# Patient Record
Sex: FEMALE | Race: WHITE | Employment: UNEMPLOYED | URBAN - METROPOLITAN AREA
[De-identification: names, ages, dates, MRNs, and addresses within clinical notes are randomized per-mention and may not be internally consistent; named-entity substitution may affect disease eponyms.]

---

## 2017-04-11 PROBLEM — M54.16 LUMBAR RADICULOPATHY: Status: ACTIVE | Noted: 2017-04-11

## 2017-07-26 PROBLEM — M79.641 PAIN IN BOTH HANDS: Status: ACTIVE | Noted: 2017-07-26

## 2017-07-26 PROBLEM — M79.642 PAIN IN BOTH HANDS: Status: ACTIVE | Noted: 2017-07-26

## 2017-08-08 PROCEDURE — 81003 URINALYSIS AUTO W/O SCOPE: CPT | Performed by: INTERNAL MEDICINE

## 2017-08-08 PROCEDURE — 87081 CULTURE SCREEN ONLY: CPT | Performed by: INTERNAL MEDICINE

## 2017-08-24 ENCOUNTER — APPOINTMENT (OUTPATIENT)
Dept: GENERAL RADIOLOGY | Facility: HOSPITAL | Age: 52
DRG: 029 | End: 2017-08-24
Attending: ORTHOPAEDIC SURGERY
Payer: OTHER GOVERNMENT

## 2017-08-24 ENCOUNTER — SURGERY (OUTPATIENT)
Age: 52
End: 2017-08-24

## 2017-08-24 ENCOUNTER — ANESTHESIA EVENT (OUTPATIENT)
Dept: SURGERY | Facility: HOSPITAL | Age: 52
DRG: 029 | End: 2017-08-24
Payer: OTHER GOVERNMENT

## 2017-08-24 ENCOUNTER — ANESTHESIA (OUTPATIENT)
Dept: SURGERY | Facility: HOSPITAL | Age: 52
DRG: 029 | End: 2017-08-24
Payer: OTHER GOVERNMENT

## 2017-08-24 ENCOUNTER — HOSPITAL ENCOUNTER (INPATIENT)
Facility: HOSPITAL | Age: 52
LOS: 2 days | Discharge: HOME | DRG: 029 | End: 2017-08-26
Attending: ORTHOPAEDIC SURGERY | Admitting: ORTHOPAEDIC SURGERY
Payer: OTHER GOVERNMENT

## 2017-08-24 DIAGNOSIS — M79.641 PAIN IN BOTH HANDS: ICD-10-CM

## 2017-08-24 DIAGNOSIS — M79.642 PAIN IN BOTH HANDS: ICD-10-CM

## 2017-08-24 DIAGNOSIS — M47.812 CERVICAL SPONDYLOSIS WITHOUT MYELOPATHY: ICD-10-CM

## 2017-08-24 DIAGNOSIS — M50.123 CERVICAL DISC DISORDER AT C6-C7 LEVEL WITH RADICULOPATHY: ICD-10-CM

## 2017-08-24 DIAGNOSIS — M54.12 CERVICAL RADICULITIS: Primary | ICD-10-CM

## 2017-08-24 LAB — B-HCG UR QL: NEGATIVE

## 2017-08-24 PROCEDURE — 95939 C MOTOR EVOKED UPR&LWR LIMBS: CPT | Performed by: ORTHOPAEDIC SURGERY

## 2017-08-24 PROCEDURE — 95938 SOMATOSENSORY TESTING: CPT | Performed by: ORTHOPAEDIC SURGERY

## 2017-08-24 PROCEDURE — 76001 XR C-ARM FLUORO >1 HOUR  (CPT=76001): CPT | Performed by: ORTHOPAEDIC SURGERY

## 2017-08-24 PROCEDURE — 0RB30ZZ EXCISION OF CERVICAL VERTEBRAL DISC, OPEN APPROACH: ICD-10-PCS | Performed by: ORTHOPAEDIC SURGERY

## 2017-08-24 PROCEDURE — 01N10ZZ RELEASE CERVICAL NERVE, OPEN APPROACH: ICD-10-PCS | Performed by: ORTHOPAEDIC SURGERY

## 2017-08-24 PROCEDURE — 81025 URINE PREGNANCY TEST: CPT

## 2017-08-24 PROCEDURE — 0RG20A0 FUSION OF 2 OR MORE CERVICAL VERTEBRAL JOINTS WITH INTERBODY FUSION DEVICE, ANTERIOR APPROACH, ANTERIOR COLUMN, OPEN APPROACH: ICD-10-PCS | Performed by: ORTHOPAEDIC SURGERY

## 2017-08-24 PROCEDURE — 72020 X-RAY EXAM OF SPINE 1 VIEW: CPT | Performed by: ORTHOPAEDIC SURGERY

## 2017-08-24 PROCEDURE — 95860 NEEDLE EMG 1 EXTREMITY: CPT | Performed by: ORTHOPAEDIC SURGERY

## 2017-08-24 DEVICE — IMPLANTABLE DEVICE: Type: IMPLANTABLE DEVICE | Site: NECK | Status: FUNCTIONAL

## 2017-08-24 DEVICE — GRAFT BN DMNR FBR 5ML: Type: IMPLANTABLE DEVICE | Site: NECK | Status: FUNCTIONAL

## 2017-08-24 RX ORDER — SODIUM CHLORIDE 9 MG/ML
INJECTION, SOLUTION INTRAVENOUS CONTINUOUS PRN
Status: DISCONTINUED | OUTPATIENT
Start: 2017-08-24 | End: 2017-08-24 | Stop reason: SURG

## 2017-08-24 RX ORDER — SODIUM CHLORIDE, SODIUM LACTATE, POTASSIUM CHLORIDE, CALCIUM CHLORIDE 600; 310; 30; 20 MG/100ML; MG/100ML; MG/100ML; MG/100ML
INJECTION, SOLUTION INTRAVENOUS CONTINUOUS
Status: DISCONTINUED | OUTPATIENT
Start: 2017-08-24 | End: 2017-08-25

## 2017-08-24 RX ORDER — METOCLOPRAMIDE HYDROCHLORIDE 5 MG/ML
10 INJECTION INTRAMUSCULAR; INTRAVENOUS EVERY 6 HOURS PRN
Status: DISCONTINUED | OUTPATIENT
Start: 2017-08-24 | End: 2017-08-26

## 2017-08-24 RX ORDER — LIDOCAINE HYDROCHLORIDE 10 MG/ML
INJECTION, SOLUTION EPIDURAL; INFILTRATION; INTRACAUDAL; PERINEURAL AS NEEDED
Status: DISCONTINUED | OUTPATIENT
Start: 2017-08-24 | End: 2017-08-24 | Stop reason: SURG

## 2017-08-24 RX ORDER — ACETAMINOPHEN 325 MG/1
650 TABLET ORAL ONCE
Status: COMPLETED | OUTPATIENT
Start: 2017-08-24 | End: 2017-08-24

## 2017-08-24 RX ORDER — MORPHINE SULFATE 2 MG/ML
2 INJECTION, SOLUTION INTRAMUSCULAR; INTRAVENOUS EVERY 10 MIN PRN
Status: DISCONTINUED | OUTPATIENT
Start: 2017-08-24 | End: 2017-08-24 | Stop reason: HOSPADM

## 2017-08-24 RX ORDER — GLYCOPYRROLATE 0.2 MG/ML
INJECTION INTRAMUSCULAR; INTRAVENOUS AS NEEDED
Status: DISCONTINUED | OUTPATIENT
Start: 2017-08-24 | End: 2017-08-24 | Stop reason: SURG

## 2017-08-24 RX ORDER — METOCLOPRAMIDE 10 MG/1
10 TABLET ORAL ONCE
Status: DISCONTINUED | OUTPATIENT
Start: 2017-08-24 | End: 2017-08-24 | Stop reason: HOSPADM

## 2017-08-24 RX ORDER — HYDROCODONE BITARTRATE AND ACETAMINOPHEN 10; 325 MG/1; MG/1
2 TABLET ORAL EVERY 4 HOURS PRN
Status: DISCONTINUED | OUTPATIENT
Start: 2017-08-24 | End: 2017-08-26

## 2017-08-24 RX ORDER — HYDROCODONE BITARTRATE AND ACETAMINOPHEN 10; 325 MG/1; MG/1
1 TABLET ORAL EVERY 6 HOURS PRN
Qty: 80 TABLET | Refills: 0 | Status: SHIPPED | OUTPATIENT
Start: 2017-08-24 | End: 2017-08-25

## 2017-08-24 RX ORDER — SODIUM PHOSPHATE, DIBASIC AND SODIUM PHOSPHATE, MONOBASIC 7; 19 G/133ML; G/133ML
1 ENEMA RECTAL ONCE AS NEEDED
Status: DISCONTINUED | OUTPATIENT
Start: 2017-08-24 | End: 2017-08-26

## 2017-08-24 RX ORDER — ONDANSETRON 2 MG/ML
4 INJECTION INTRAMUSCULAR; INTRAVENOUS EVERY 4 HOURS PRN
Status: DISPENSED | OUTPATIENT
Start: 2017-08-24 | End: 2017-08-25

## 2017-08-24 RX ORDER — HYDROCODONE BITARTRATE AND ACETAMINOPHEN 5; 325 MG/1; MG/1
2 TABLET ORAL AS NEEDED
Status: DISCONTINUED | OUTPATIENT
Start: 2017-08-24 | End: 2017-08-24 | Stop reason: HOSPADM

## 2017-08-24 RX ORDER — CYCLOBENZAPRINE HCL 10 MG
10 TABLET ORAL EVERY 8 HOURS PRN
Status: DISCONTINUED | OUTPATIENT
Start: 2017-08-24 | End: 2017-08-26

## 2017-08-24 RX ORDER — MORPHINE SULFATE 4 MG/ML
6 INJECTION, SOLUTION INTRAMUSCULAR; INTRAVENOUS EVERY 10 MIN PRN
Status: DISCONTINUED | OUTPATIENT
Start: 2017-08-24 | End: 2017-08-24 | Stop reason: HOSPADM

## 2017-08-24 RX ORDER — ACETAMINOPHEN 325 MG/1
650 TABLET ORAL EVERY 4 HOURS PRN
Status: DISCONTINUED | OUTPATIENT
Start: 2017-08-24 | End: 2017-08-26

## 2017-08-24 RX ORDER — MAGNESIUM OXIDE 400 MG (241.3 MG MAGNESIUM) TABLET
3 TABLET NIGHTLY
Status: DISCONTINUED | OUTPATIENT
Start: 2017-08-24 | End: 2017-08-26

## 2017-08-24 RX ORDER — MIDAZOLAM HYDROCHLORIDE 1 MG/ML
INJECTION INTRAMUSCULAR; INTRAVENOUS AS NEEDED
Status: DISCONTINUED | OUTPATIENT
Start: 2017-08-24 | End: 2017-08-24 | Stop reason: SURG

## 2017-08-24 RX ORDER — DIPHENHYDRAMINE HYDROCHLORIDE 50 MG/ML
25 INJECTION INTRAMUSCULAR; INTRAVENOUS EVERY 4 HOURS PRN
Status: DISCONTINUED | OUTPATIENT
Start: 2017-08-24 | End: 2017-08-26

## 2017-08-24 RX ORDER — ONDANSETRON 2 MG/ML
4 INJECTION INTRAMUSCULAR; INTRAVENOUS ONCE AS NEEDED
Status: DISCONTINUED | OUTPATIENT
Start: 2017-08-24 | End: 2017-08-24 | Stop reason: HOSPADM

## 2017-08-24 RX ORDER — MORPHINE SULFATE 4 MG/ML
4 INJECTION, SOLUTION INTRAMUSCULAR; INTRAVENOUS EVERY 10 MIN PRN
Status: DISCONTINUED | OUTPATIENT
Start: 2017-08-24 | End: 2017-08-24 | Stop reason: HOSPADM

## 2017-08-24 RX ORDER — DEXAMETHASONE SODIUM PHOSPHATE 4 MG/ML
VIAL (ML) INJECTION AS NEEDED
Status: DISCONTINUED | OUTPATIENT
Start: 2017-08-24 | End: 2017-08-24 | Stop reason: SURG

## 2017-08-24 RX ORDER — MELATONIN
3 NIGHTLY
COMMUNITY
End: 2018-01-15

## 2017-08-24 RX ORDER — NALOXONE HYDROCHLORIDE 0.4 MG/ML
80 INJECTION, SOLUTION INTRAMUSCULAR; INTRAVENOUS; SUBCUTANEOUS AS NEEDED
Status: DISCONTINUED | OUTPATIENT
Start: 2017-08-24 | End: 2017-08-24 | Stop reason: HOSPADM

## 2017-08-24 RX ORDER — FAMOTIDINE 20 MG/1
20 TABLET ORAL ONCE
Status: DISCONTINUED | OUTPATIENT
Start: 2017-08-24 | End: 2017-08-24 | Stop reason: HOSPADM

## 2017-08-24 RX ORDER — DIAZEPAM 5 MG/1
5 TABLET ORAL EVERY 6 HOURS PRN
Status: DISCONTINUED | OUTPATIENT
Start: 2017-08-24 | End: 2017-08-26

## 2017-08-24 RX ORDER — HYDROMORPHONE HYDROCHLORIDE 1 MG/ML
0.4 INJECTION, SOLUTION INTRAMUSCULAR; INTRAVENOUS; SUBCUTANEOUS EVERY 5 MIN PRN
Status: DISCONTINUED | OUTPATIENT
Start: 2017-08-24 | End: 2017-08-24 | Stop reason: HOSPADM

## 2017-08-24 RX ORDER — POLYETHYLENE GLYCOL 3350 17 G/17G
17 POWDER, FOR SOLUTION ORAL DAILY PRN
Status: DISCONTINUED | OUTPATIENT
Start: 2017-08-24 | End: 2017-08-26

## 2017-08-24 RX ORDER — CYCLOBENZAPRINE HCL 10 MG
10 TABLET ORAL 3 TIMES DAILY
Qty: 80 TABLET | Refills: 1 | Status: SHIPPED | OUTPATIENT
Start: 2017-08-24 | End: 2017-08-26

## 2017-08-24 RX ORDER — SENNOSIDES 8.6 MG
17.2 TABLET ORAL NIGHTLY
Status: DISCONTINUED | OUTPATIENT
Start: 2017-08-24 | End: 2017-08-26

## 2017-08-24 RX ORDER — GABAPENTIN 300 MG/1
300 CAPSULE ORAL 3 TIMES DAILY
Status: DISCONTINUED | OUTPATIENT
Start: 2017-08-24 | End: 2017-08-26

## 2017-08-24 RX ORDER — DIPHENHYDRAMINE HCL 25 MG
25 CAPSULE ORAL EVERY 4 HOURS PRN
Status: DISCONTINUED | OUTPATIENT
Start: 2017-08-24 | End: 2017-08-26

## 2017-08-24 RX ORDER — HYDROMORPHONE HYDROCHLORIDE 1 MG/ML
0.6 INJECTION, SOLUTION INTRAMUSCULAR; INTRAVENOUS; SUBCUTANEOUS EVERY 5 MIN PRN
Status: DISCONTINUED | OUTPATIENT
Start: 2017-08-24 | End: 2017-08-24 | Stop reason: HOSPADM

## 2017-08-24 RX ORDER — ONDANSETRON 2 MG/ML
INJECTION INTRAMUSCULAR; INTRAVENOUS AS NEEDED
Status: DISCONTINUED | OUTPATIENT
Start: 2017-08-24 | End: 2017-08-24 | Stop reason: SURG

## 2017-08-24 RX ORDER — HYDROCODONE BITARTRATE AND ACETAMINOPHEN 5; 325 MG/1; MG/1
1 TABLET ORAL AS NEEDED
Status: DISCONTINUED | OUTPATIENT
Start: 2017-08-24 | End: 2017-08-24 | Stop reason: HOSPADM

## 2017-08-24 RX ORDER — DOCUSATE SODIUM 100 MG/1
100 CAPSULE, LIQUID FILLED ORAL 2 TIMES DAILY
Status: DISCONTINUED | OUTPATIENT
Start: 2017-08-24 | End: 2017-08-26

## 2017-08-24 RX ORDER — BISACODYL 10 MG
10 SUPPOSITORY, RECTAL RECTAL
Status: DISCONTINUED | OUTPATIENT
Start: 2017-08-24 | End: 2017-08-26

## 2017-08-24 RX ORDER — SUCCINYLCHOLINE CHLORIDE 20 MG/ML
INJECTION INTRAMUSCULAR; INTRAVENOUS AS NEEDED
Status: DISCONTINUED | OUTPATIENT
Start: 2017-08-24 | End: 2017-08-24 | Stop reason: SURG

## 2017-08-24 RX ORDER — HYDROMORPHONE HYDROCHLORIDE 1 MG/ML
0.2 INJECTION, SOLUTION INTRAMUSCULAR; INTRAVENOUS; SUBCUTANEOUS EVERY 5 MIN PRN
Status: DISCONTINUED | OUTPATIENT
Start: 2017-08-24 | End: 2017-08-24 | Stop reason: HOSPADM

## 2017-08-24 RX ORDER — HYDROCODONE BITARTRATE AND ACETAMINOPHEN 10; 325 MG/1; MG/1
1 TABLET ORAL EVERY 4 HOURS PRN
Status: DISCONTINUED | OUTPATIENT
Start: 2017-08-24 | End: 2017-08-26

## 2017-08-24 RX ADMIN — SUCCINYLCHOLINE CHLORIDE 160 MG: 20 INJECTION INTRAMUSCULAR; INTRAVENOUS at 07:47:00

## 2017-08-24 RX ADMIN — DEXAMETHASONE SODIUM PHOSPHATE 4 MG: 4 MG/ML VIAL (ML) INJECTION at 07:45:00

## 2017-08-24 RX ADMIN — ONDANSETRON 4 MG: 2 INJECTION INTRAMUSCULAR; INTRAVENOUS at 07:45:00

## 2017-08-24 RX ADMIN — LIDOCAINE HYDROCHLORIDE 20 MG: 10 INJECTION, SOLUTION EPIDURAL; INFILTRATION; INTRACAUDAL; PERINEURAL at 07:47:00

## 2017-08-24 RX ADMIN — SODIUM CHLORIDE: 9 INJECTION, SOLUTION INTRAVENOUS at 08:00:00

## 2017-08-24 RX ADMIN — MIDAZOLAM HYDROCHLORIDE 2 MG: 1 INJECTION INTRAMUSCULAR; INTRAVENOUS at 07:45:00

## 2017-08-24 RX ADMIN — LIDOCAINE HYDROCHLORIDE 30 MG: 10 INJECTION, SOLUTION EPIDURAL; INFILTRATION; INTRACAUDAL; PERINEURAL at 07:45:00

## 2017-08-24 RX ADMIN — SODIUM CHLORIDE, SODIUM LACTATE, POTASSIUM CHLORIDE, CALCIUM CHLORIDE: 600; 310; 30; 20 INJECTION, SOLUTION INTRAVENOUS at 07:38:00

## 2017-08-24 RX ADMIN — SODIUM CHLORIDE, SODIUM LACTATE, POTASSIUM CHLORIDE, CALCIUM CHLORIDE: 600; 310; 30; 20 INJECTION, SOLUTION INTRAVENOUS at 09:55:00

## 2017-08-24 RX ADMIN — GLYCOPYRROLATE 0.2 MG: 0.2 INJECTION INTRAMUSCULAR; INTRAVENOUS at 07:45:00

## 2017-08-24 NOTE — ANESTHESIA PREPROCEDURE EVALUATION
Anesthesia PreOp Note    HPI:     Louis Jimenez is a 46year old female who presents for preoperative consultation requested by: Avril Gutierrez MD    Date of Surgery: 8/24/2017    Procedure(s):  ANTERIOR CERVICAL FUSION BG & INST 1 LEVEL  Indication: F Disorder Father    • Hypertension Father    • Alcohol and Other Disorders Associated Father    • Depression Sister    • Cancer Mother      Breast cancer   • Diabetes Maternal Grandmother    • Alcohol and Other Disorders Associated Maternal Grandfather    • history of anesthetic complications   Airway   Mallampati: II  Neck ROM: full  Dental - normal exam     Pulmonary - negative ROS and normal exam   Cardiovascular - negative ROS and normal exam    Neuro/Psych - negative ROS     GI/Hepatic/Renal - negative R

## 2017-08-24 NOTE — H&P
Spine Surgery H&P  Dr. Steven Andrews Patient Status:  Surgery Admit    1965 MRN [id number]   Location [facility] Attending Barb Perez MD   Hosp Day # 0 PCP Kiana Bingham MD     Subjective:  Paul Jordan Years of education:                 Number of children:               Social History Main Topics    Smoking status: Former Smoker                                                                Packs/day: 1.00      Years: 13.00       Smoke no step off   Spine is balance in the sagittal and coronal plane  Gait : Tandem    Upper Extremities Motor Exam:    Deep Tendon reflexes :     FDP WE WF EE EF Delt     BTR TTR   Right 5 5 5 5 5 5  Right :  2+ 2+  Left 5 5 5 5 5  5 /5  Left   :  2+ 2+    Sa eccentric to the right. Right facet  arthropathy is noted. Tiny ectatic nerve root sleeves are present bilaterally.  Findings result in  moderate to severe right foraminal narrowing and moderate left foraminal narrowing, similar to prior    Labs:       Lab

## 2017-08-24 NOTE — H&P
[facility] Hospitalist Team  History and Physical     ASSESSMENT / PLAN:   47 yo female with hx insomnia and cervical radiculopathy who presents for surgery after failing outpt medical management.  Pt is S/P ACDF C5-C7, see below for details    S/P ACDF C5-C7  -IV peripheral pulses intact       PMH  Past Medical History:   Diagnosis Date   • Cancer ([facility] [address]     ORAL   • Cervical radiculopathy    • Other and unspecified hyperlipidemia    • Plantar fasciitis         PSH  Past Surgical History:  No date:       Co PLT, BAND, INR in the last 72 hours. Invalid input(s): LYM#, MONO#, BASOS#, EOSIN#    No results for input(s): NA, K, CL, CO2, BUN, CREATSERUM, GLU, CA, CAION, MG, PHOS in the last 72 hours.     No results for input(s): ALT, AST, ALB, AMYLASE, LIPASE, LD meds  -monitor for acute blood loss anemia, preop hemoglobin per outpatient chart review 12.2  -Bowel reg, antiemetics   -DVT Prophy- SCD  -PT/OT/SW  -as per surgery     Rest as above with above

## 2017-08-24 NOTE — OPERATIVE REPORT
Hospital  Operative Report    [facility] Patient Status:  Surgery Admit    1965 MRN [id number]   Location [address] [facility] Attending Jasbir Pedraza MD   Hosp Day # 0 PCP Amalia Tidwell MD     2017  10:00 AM    PREOPERAT Then we turned our attention to the neck part of the surgery.   We obtained lateral fluoroscopy and made a 2-cm incision lateral and just anterior to the SCM at the level of C5-6, dissected through subcutaneous tissue, exposed the platysma with Michelle Gondola the discectomy with a straight curette. She had bilateral hypertrophic uncinate processes which were resected with a bur. Subsequently, the PLL could be elevated with the Microhook hook and resected, first with the Kerrison 1 then 2.   There was abundant

## 2017-08-24 NOTE — ANESTHESIA POSTPROCEDURE EVALUATION
Patient: Ratna Jimenes    Procedure Summary     Date:  08/24/17 Room / Location:  [address] [place] [address] MAIN OR 11 / [address] [place] [address] MAIN OR    Anesthesia Start:  1141 Anesthesia Stop:      Procedure:  ANTERIOR CERVICAL FUSION BG & INST 1 LEVEL (N/A Neck) Diagnosis:  (Foraminal stenosi

## 2017-08-25 LAB
ANION GAP SERPL CALC-SCNC: 5 MMOL/L (ref 0–18)
BASOPHILS # BLD: 0 K/UL (ref 0–0.2)
BASOPHILS NFR BLD: 0 %
BUN SERPL-MCNC: 4 MG/DL (ref 8–20)
BUN/CREAT SERPL: 6.6 (ref 10–20)
CALCIUM SERPL-MCNC: 8.7 MG/DL (ref 8.5–10.5)
CHLORIDE SERPL-SCNC: 107 MMOL/L (ref 95–110)
CO2 SERPL-SCNC: 31 MMOL/L (ref 22–32)
CREAT SERPL-MCNC: 0.61 MG/DL (ref 0.5–1.5)
EOSINOPHIL # BLD: 0.2 K/UL (ref 0–0.7)
EOSINOPHIL NFR BLD: 3 %
ERYTHROCYTE [DISTWIDTH] IN BLOOD BY AUTOMATED COUNT: 12.7 % (ref 11–15)
GLUCOSE SERPL-MCNC: 110 MG/DL (ref 70–99)
HCT VFR BLD AUTO: 36 % (ref 35–48)
HGB BLD-MCNC: 12.1 G/DL (ref 12–16)
LYMPHOCYTES # BLD: 1.6 K/UL (ref 1–4)
LYMPHOCYTES NFR BLD: 23 %
MCH RBC QN AUTO: 30.2 PG (ref 27–32)
MCHC RBC AUTO-ENTMCNC: 33.5 G/DL (ref 32–37)
MCV RBC AUTO: 90 FL (ref 80–100)
MONOCYTES # BLD: 0.6 K/UL (ref 0–1)
MONOCYTES NFR BLD: 9 %
NEUTROPHILS # BLD AUTO: 4.4 K/UL (ref 1.8–7.7)
NEUTROPHILS NFR BLD: 64 %
OSMOLALITY UR CALC.SUM OF ELEC: 294 MOSM/KG (ref 275–295)
PLATELET # BLD AUTO: 168 K/UL (ref 140–400)
PMV BLD AUTO: 9.4 FL (ref 7.4–10.3)
POTASSIUM SERPL-SCNC: 3.5 MMOL/L (ref 3.3–5.1)
RBC # BLD AUTO: 4 M/UL (ref 3.7–5.4)
SODIUM SERPL-SCNC: 143 MMOL/L (ref 136–144)
WBC # BLD AUTO: 6.9 K/UL (ref 4–11)

## 2017-08-25 PROCEDURE — 97535 SELF CARE MNGMENT TRAINING: CPT

## 2017-08-25 PROCEDURE — 97161 PT EVAL LOW COMPLEX 20 MIN: CPT

## 2017-08-25 PROCEDURE — 80048 BASIC METABOLIC PNL TOTAL CA: CPT | Performed by: NURSE PRACTITIONER

## 2017-08-25 PROCEDURE — 97165 OT EVAL LOW COMPLEX 30 MIN: CPT

## 2017-08-25 PROCEDURE — 85025 COMPLETE CBC W/AUTO DIFF WBC: CPT | Performed by: NURSE PRACTITIONER

## 2017-08-25 PROCEDURE — 97530 THERAPEUTIC ACTIVITIES: CPT

## 2017-08-25 RX ORDER — 0.9 % SODIUM CHLORIDE 0.9 %
VIAL (ML) INJECTION
Status: DISPENSED
Start: 2017-08-25 | End: 2017-08-26

## 2017-08-25 RX ORDER — BUTALBITAL, ACETAMINOPHEN AND CAFFEINE 50; 325; 40 MG/1; MG/1; MG/1
1 TABLET ORAL EVERY 4 HOURS PRN
Status: DISCONTINUED | OUTPATIENT
Start: 2017-08-25 | End: 2017-08-26

## 2017-08-25 RX ORDER — HYDROCODONE BITARTRATE AND ACETAMINOPHEN 10; 325 MG/1; MG/1
TABLET ORAL
Qty: 60 TABLET | Refills: 0 | Status: SHIPPED | OUTPATIENT
Start: 2017-08-25 | End: 2017-11-20

## 2017-08-25 NOTE — OCCUPATIONAL THERAPY NOTE
OCCUPATIONAL THERAPY QUICK EVALUATION - INPATIENT    Room Number: 406/406-A  Evaluation Date: 8/25/2017     Type of Evaluation: Initial  Presenting Problem: ACDF C5-7    Physician Order: IP Consult to Occupational Therapy  Reason for Therapy:  ADL/IADL Dys Upper extremity strength is within functional limits     ACTIVITIES OF DAILY LIVING ASSESSMENT  AM-PAC ‘6-Clicks’ Inpatient Daily Activity Short Form  How much help from another person does the patient currently need…  -   Putting on and taking off reg Pt demonstrating understanding of information as discussed. Return to home seems reasonable. At end of session pt returned to bed and left w/ all needs in reach. RN aware.  No further OT contact planned    OT Discharge Recommendations: Home  OT Device Recom

## 2017-08-25 NOTE — PROGRESS NOTES
[facility]  Progress Note    Beth Paula Patient Status:  Inpatient    1965 MRN [id number]   Location [facility] 4W/SW/SE Attending Sharath Greer MD   Hosp Day # 1 PCP Corinne Solano MD     Subjective:  Beth Paula is a(n) 46 ye lateral flexion of cervical spine. No JVD. Supple. Lungs: Clear to auscultation bilaterally. Cardiac: Regular rate and rhythm. No murmur. Abdomen:  Soft, non-distended, non-tender, with no rebound or guarding. No peritoneal signs. No ascites.   Liver

## 2017-08-25 NOTE — PHYSICAL THERAPY NOTE
PHYSICAL THERAPY QUICK EVALUATION - INPATIENT    Room Number: 406/406-A  Evaluation Date: 8/25/2017  Presenting Problem: C5-7 anterior cervica discectmy & fusion  Physician Order: PT Eval and Treat    Problem List  Principal Problem:    Cervical radiculiti walk in hospital room?: A Little   -   Climbing 3-5 steps with a railing?: A Little       AM-PAC Score:  Raw Score: 22   PT Approx Degree of Impairment Score: 20.91%   Standardized Score (AM-PAC Scale): 53.28   CMS Modifier (G-Code): CJ      FUNCTIONAL AUGUSTIN

## 2017-08-25 NOTE — DISCHARGE PLANNING
MD order received regarding HHC. The pt. Is refusing HHC as she has family that will be assisting her at home.       [facility] [place] [phone]

## 2017-08-25 NOTE — DISCHARGE SUMMARY
[facility] Hospitalist Discharge Summary   Patient ID:  Gamaliel Lucas  [id number]  46year old  2/11/1965    Admit date: 8/24/2017  Discharge date: 8/25/2017  Risk of Readmission Lace+ Score: 1  59-90 High Risk  29-58 Medium Risk  0-28   Low Risk    Primary Care localization C6 level during anterior cervical discectomy and cervical spinal fusion procedure from C5-C6-C7.  Normal alignment           Discharge Instructions     Medication List      START taking these medications    Cyclobenzaprine HCl 10 MG Tabs  Commo good  Total Time Coordinating Care: greater  than 30 minutes    Patient had opportunity to ask questions and state understand and agree with therapeutic plan as outlined    Kaleb  RN, NP   [facility] Hospitalist Team  Pager [phone]  Answering Service n

## 2017-08-25 NOTE — CM/SW NOTE
CTL update from Dr. Kiana Aviles and RN. Met with patient at bedside stating she anticipates going home this evening. Her  has Parkinsons's but is able to drive and will transport her home to [place].   Patient's mother will fly in from [place] to [facility]

## 2017-08-26 VITALS
TEMPERATURE: 99 F | WEIGHT: 132 LBS | HEIGHT: 63 IN | HEART RATE: 92 BPM | SYSTOLIC BLOOD PRESSURE: 120 MMHG | BODY MASS INDEX: 23.39 KG/M2 | OXYGEN SATURATION: 94 % | RESPIRATION RATE: 16 BRPM | DIASTOLIC BLOOD PRESSURE: 60 MMHG

## 2017-08-26 LAB
BASOPHILS # BLD: 0 K/UL (ref 0–0.2)
BASOPHILS NFR BLD: 0 %
EOSINOPHIL # BLD: 0.2 K/UL (ref 0–0.7)
EOSINOPHIL NFR BLD: 3 %
ERYTHROCYTE [DISTWIDTH] IN BLOOD BY AUTOMATED COUNT: 12.9 % (ref 11–15)
HCT VFR BLD AUTO: 35.9 % (ref 35–48)
HGB BLD-MCNC: 12.2 G/DL (ref 12–16)
LYMPHOCYTES # BLD: 2.1 K/UL (ref 1–4)
LYMPHOCYTES NFR BLD: 30 %
MCH RBC QN AUTO: 30.4 PG (ref 27–32)
MCHC RBC AUTO-ENTMCNC: 33.9 G/DL (ref 32–37)
MCV RBC AUTO: 89.6 FL (ref 80–100)
MONOCYTES # BLD: 0.7 K/UL (ref 0–1)
MONOCYTES NFR BLD: 10 %
NEUTROPHILS # BLD AUTO: 4 K/UL (ref 1.8–7.7)
NEUTROPHILS NFR BLD: 57 %
PLATELET # BLD AUTO: 188 K/UL (ref 140–400)
PMV BLD AUTO: 9.5 FL (ref 7.4–10.3)
RBC # BLD AUTO: 4 M/UL (ref 3.7–5.4)
WBC # BLD AUTO: 6.9 K/UL (ref 4–11)

## 2017-08-26 PROCEDURE — 85025 COMPLETE CBC W/AUTO DIFF WBC: CPT | Performed by: NURSE PRACTITIONER

## 2017-08-26 RX ORDER — DIAZEPAM 5 MG/1
5 TABLET ORAL EVERY 6 HOURS PRN
Qty: 30 TABLET | Refills: 0 | Status: SHIPPED | OUTPATIENT
Start: 2017-08-26 | End: 2017-10-09

## 2017-08-26 RX ORDER — DIAZEPAM 10 MG/1
10 TABLET ORAL EVERY 8 HOURS PRN
Qty: 30 TABLET | Refills: 0 | Status: SHIPPED | OUTPATIENT
Start: 2017-08-26 | End: 2017-08-26

## 2017-08-26 RX ORDER — DIAZEPAM 5 MG/1
5 TABLET ORAL EVERY 6 HOURS PRN
Qty: 30 TABLET | Refills: 0 | Status: SHIPPED | OUTPATIENT
Start: 2017-08-26 | End: 2017-08-26

## 2017-08-26 RX ORDER — DIAZEPAM 10 MG/1
5 TABLET ORAL EVERY 6 HOURS PRN
Qty: 30 TABLET | Refills: 0 | Status: SHIPPED | OUTPATIENT
Start: 2017-08-26 | End: 2017-08-26

## 2017-08-26 NOTE — PROGRESS NOTES
[facility] Hospitalist Progress Note     CC: Hospital Follow up    PCP: Vickey Kaplan MD    Patient seen and examined at approx 1230pm   Assessment/Plan:   47 yo female with hx insomnia and cervical radiculopathy who presents for surgery after failing outpt medic intact, sensory intact  Psych: Affect- normal, AAOx3  SKIN: warm, dry  EXT: no lower extremity edema, no clubbing or cyanosis    Data Review:       Labs:     Recent Labs   Lab  08/25/17   0920   RBC  4.00   HGB  12.1   HCT  36.0   MCV  90.0   MCH  30.2   M

## 2017-08-27 NOTE — DISCHARGE SUMMARY
[facility] Hospitalist Discharge Summary   Patient ID:  Marilyn Mccormick  [id number]  46year old  2/11/1965    Admit date: 8/24/2017  Discharge date: 8/26/2017  Primary Care Physician: Elicia Bamberger, MD   Attending Physician: No att. providers found   Consults:   Co NEURONTIN  Take 1 capsule (300 mg total) by mouth 3 (three) times daily.      melatonin 3 MG Tabs        STOP taking these medications    ESTROVEN PM OR     ibuprofen 200 MG Tabs  Commonly known as:  MOTRIN           Where to Get Your Medications      You c

## 2017-12-05 ENCOUNTER — OFFICE VISIT (OUTPATIENT)
Dept: PHYSICAL THERAPY | Age: 52
End: 2017-12-05
Attending: ORTHOPAEDIC SURGERY
Payer: OTHER GOVERNMENT

## 2017-12-05 DIAGNOSIS — M50.123 CERVICAL DISC DISORDER AT C6-C7 LEVEL WITH RADICULOPATHY: ICD-10-CM

## 2017-12-05 DIAGNOSIS — Q76.1 CERVICAL FUSION SYNDROME: ICD-10-CM

## 2017-12-05 PROCEDURE — 97162 PT EVAL MOD COMPLEX 30 MIN: CPT

## 2017-12-05 PROCEDURE — 97110 THERAPEUTIC EXERCISES: CPT

## 2017-12-05 NOTE — PROGRESS NOTES
SPINE EVALUATION:   Referring Physician: Dr. Elly Arana  Diagnosis:  Cervical disc disorder at C6-C7 level with radiculopathy (N06.608)  Cervical fusion syndrome (Q76.1)     Date of Service: 12/5/2017     PATIENT SUMMARY   [facility] is a 46year old upper trapezius L > R. The patient presents with minimal UE weakness with pain during left shoulder flexion. The patient is unable to hold onto items due to her numbness and tingling.  Her current functional limitations include lifting, looking over her arie pain free shoulder strength to 4+/5 in order to facilitate proper lifting mechanics for weight >15lbs to support activities at her workplace ([employer]).   Patient will be independent and compliant with HEP to support improvements made in physical therapy (8

## 2017-12-07 ENCOUNTER — OFFICE VISIT (OUTPATIENT)
Dept: PHYSICAL THERAPY | Age: 52
End: 2017-12-07
Attending: ORTHOPAEDIC SURGERY
Payer: OTHER GOVERNMENT

## 2017-12-07 DIAGNOSIS — M50.123 CERVICAL DISC DISORDER AT C6-C7 LEVEL WITH RADICULOPATHY: ICD-10-CM

## 2017-12-07 DIAGNOSIS — Q76.1 CERVICAL FUSION SYNDROME: ICD-10-CM

## 2017-12-07 PROCEDURE — 97140 MANUAL THERAPY 1/> REGIONS: CPT

## 2017-12-07 PROCEDURE — 97112 NEUROMUSCULAR REEDUCATION: CPT

## 2017-12-07 PROCEDURE — 97110 THERAPEUTIC EXERCISES: CPT

## 2017-12-11 ENCOUNTER — TELEPHONE (OUTPATIENT)
Dept: PHYSICAL THERAPY | Age: 52
End: 2017-12-11

## 2017-12-13 ENCOUNTER — OFFICE VISIT (OUTPATIENT)
Dept: PHYSICAL THERAPY | Age: 52
End: 2017-12-13
Attending: ORTHOPAEDIC SURGERY
Payer: OTHER GOVERNMENT

## 2017-12-13 DIAGNOSIS — M50.123 CERVICAL DISC DISORDER AT C6-C7 LEVEL WITH RADICULOPATHY: ICD-10-CM

## 2017-12-13 DIAGNOSIS — Q76.1 CERVICAL FUSION SYNDROME: ICD-10-CM

## 2017-12-13 PROCEDURE — 97140 MANUAL THERAPY 1/> REGIONS: CPT

## 2017-12-13 PROCEDURE — 97110 THERAPEUTIC EXERCISES: CPT

## 2017-12-13 NOTE — PROGRESS NOTES
Certification From: 20/4/1491  To:3/5/2018     Referring Physician: Dr. Danis Estrada  Dx:Cervical disc disorder at C6-C7 level with radiculopathy (m50.123) Cervical fusion syndrome (Q76.1)    Authorized # of Visits:  12         Next MD visit: none scheduled back: cervical paraspinals, upper trap, rhomboids, suboccipitals, SCM scar mobilization: 16 minutes        MET to Pec Major in supine  3 x 30 sec Patient education: HEP, optimal posture        Patient education: optimal posture  FR alternating I, T & Y 1#

## 2017-12-18 ENCOUNTER — APPOINTMENT (OUTPATIENT)
Dept: PHYSICAL THERAPY | Age: 52
End: 2017-12-18
Attending: ORTHOPAEDIC SURGERY
Payer: OTHER GOVERNMENT

## 2017-12-19 ENCOUNTER — OFFICE VISIT (OUTPATIENT)
Dept: PHYSICAL THERAPY | Age: 52
End: 2017-12-19
Attending: ORTHOPAEDIC SURGERY
Payer: OTHER GOVERNMENT

## 2017-12-19 PROCEDURE — 97110 THERAPEUTIC EXERCISES: CPT | Performed by: PHYSICAL THERAPIST

## 2017-12-19 PROCEDURE — 97140 MANUAL THERAPY 1/> REGIONS: CPT | Performed by: PHYSICAL THERAPIST

## 2017-12-19 NOTE — PROGRESS NOTES
Referring Physician: Dr. Carmelo Kramer  Dx:Cervical disc disorder at C6-C7 level with radiculopathy (m50.123) Cervical fusion syndrome (Q76.1)    Authorized # of Visits:  12         Next MD visit: none scheduled  Fall Risk: standard         Precautions: n/a optimal posture  FR alternating I, T & Y 1# 2 x 15  1 x supine, 1 x standing X 10, 2 set, 2# in supine       Standing FR Alternating I T & Y 1#: 2 x 12 TRX shoulder flexion and pec stretch 1 x 20 ea X 1 min       TRX Shoulder Flexion 1 x 10 Rows w green tu

## 2017-12-21 ENCOUNTER — OFFICE VISIT (OUTPATIENT)
Dept: PHYSICAL THERAPY | Age: 52
End: 2017-12-21
Attending: INTERNAL MEDICINE
Payer: OTHER GOVERNMENT

## 2017-12-21 PROCEDURE — 97140 MANUAL THERAPY 1/> REGIONS: CPT | Performed by: PHYSICAL THERAPIST

## 2017-12-21 PROCEDURE — 97110 THERAPEUTIC EXERCISES: CPT | Performed by: PHYSICAL THERAPIST

## 2017-12-21 NOTE — PROGRESS NOTES
Referring Physician: Dr. Mary Norton  Dx:Cervical disc disorder at C6-C7 level with radiculopathy (m50.123) Cervical fusion syndrome (Q76.1)    Authorized # of Visits:  12         Next MD visit: none scheduled  Fall Risk: standard         Precautions: n/a posture Educated on sleeping posture       Patient education: optimal posture  FR alternating I, T & Y 1# 2 x 15  1 x supine, 1 x standing X 10, 2 set, 2# in supine x      Standing FR Alternating I T & Y 1#: 2 x 12 TRX shoulder flexion and pec stretch 1 x

## 2017-12-27 ENCOUNTER — OFFICE VISIT (OUTPATIENT)
Dept: PHYSICAL THERAPY | Age: 52
End: 2017-12-27
Attending: ORTHOPAEDIC SURGERY
Payer: OTHER GOVERNMENT

## 2017-12-27 PROCEDURE — 97112 NEUROMUSCULAR REEDUCATION: CPT | Performed by: PHYSICAL THERAPIST

## 2017-12-27 PROCEDURE — 97140 MANUAL THERAPY 1/> REGIONS: CPT | Performed by: PHYSICAL THERAPIST

## 2017-12-27 PROCEDURE — 97110 THERAPEUTIC EXERCISES: CPT | Performed by: PHYSICAL THERAPIST

## 2017-12-27 NOTE — PROGRESS NOTES
Referring Physician: Dr. Michela June  Dx:Cervical disc disorder at C6-C7 level with radiculopathy (m50.123) Cervical fusion syndrome (Q76.1), Authorized # of Visits:  12         Next MD visit: none scheduled Fall Risk: standard         Precautions: n/a Patient education: HEP, optimal posture Educated on sleeping posture  Edu- pain relief strategies, HEP.       Patient education: optimal posture  FR alternating I, T & Y 1# 2 x 15  1 x supine, 1 x standing X 10, 2 set, 2# in supine x X 10, 2 set, 2#     ADRIAN

## 2018-01-02 ENCOUNTER — OFFICE VISIT (OUTPATIENT)
Dept: PHYSICAL THERAPY | Age: 53
End: 2018-01-02
Attending: ORTHOPAEDIC SURGERY
Payer: OTHER GOVERNMENT

## 2018-01-02 PROCEDURE — 97110 THERAPEUTIC EXERCISES: CPT | Performed by: PHYSICAL THERAPIST

## 2018-01-02 PROCEDURE — 97112 NEUROMUSCULAR REEDUCATION: CPT | Performed by: PHYSICAL THERAPIST

## 2018-01-02 PROCEDURE — 97140 MANUAL THERAPY 1/> REGIONS: CPT | Performed by: PHYSICAL THERAPIST

## 2018-01-02 NOTE — PROGRESS NOTES
Referring Physician: Dr. Danis Estrada  Dx:Cervical disc disorder at C6-C7 level with radiculopathy (m50.123) Cervical fusion syndrome (Q76.1), Authorized # of Visits:  12         Next MD visit: none scheduled Fall Risk: standard         Precautions: n/a 15' X 12' X 15' X 15'    MET to Sandra Major in supine  3 x 30 sec Patient education: HEP, optimal posture Educated on sleeping posture  Edu- pain relief strategies, HEP.   Walking lunges with OH lift x 2L 5#    Patient education: optimal posture  FR padilla

## 2018-01-04 ENCOUNTER — OFFICE VISIT (OUTPATIENT)
Dept: PHYSICAL THERAPY | Age: 53
End: 2018-01-04
Attending: INTERNAL MEDICINE
Payer: OTHER GOVERNMENT

## 2018-01-04 PROCEDURE — 97110 THERAPEUTIC EXERCISES: CPT | Performed by: PHYSICAL THERAPIST

## 2018-01-04 PROCEDURE — 97140 MANUAL THERAPY 1/> REGIONS: CPT | Performed by: PHYSICAL THERAPIST

## 2018-01-04 PROCEDURE — 97112 NEUROMUSCULAR REEDUCATION: CPT | Performed by: PHYSICAL THERAPIST

## 2018-01-04 NOTE — PROGRESS NOTES
Referring Physician: Dr. Manasa Anne  Dx:Cervical disc disorder at C6-C7 level with radiculopathy (m50.123) Cervical fusion syndrome (Q76.1), Authorized # of Visits:  12         Next MD visit: none scheduled Fall Risk: standard         Precautions: n/a mobilization: 16 minutes X 15' X 12' X 15' X 15' X 12'   MET to Pec Major in supine  3 x 30 sec Patient education: HEP, optimal posture Educated on sleeping posture  Edu- pain relief strategies, HEP.   Walking lunges with OH lift x 2L 5# X 2L, 5#   Patient

## 2018-01-08 ENCOUNTER — OFFICE VISIT (OUTPATIENT)
Dept: PHYSICAL THERAPY | Age: 53
End: 2018-01-08
Attending: INTERNAL MEDICINE
Payer: OTHER GOVERNMENT

## 2018-01-08 PROCEDURE — 97112 NEUROMUSCULAR REEDUCATION: CPT | Performed by: PHYSICAL THERAPIST

## 2018-01-08 PROCEDURE — 97110 THERAPEUTIC EXERCISES: CPT | Performed by: PHYSICAL THERAPIST

## 2018-01-08 PROCEDURE — 97140 MANUAL THERAPY 1/> REGIONS: CPT | Performed by: PHYSICAL THERAPIST

## 2018-01-08 NOTE — PROGRESS NOTES
Referring Physician: Dr. Mary Norton  Dx:Cervical disc disorder at C6-C7 level with radiculopathy (m50.123) Cervical fusion syndrome (Q76.1), Authorized # of Visits:  12         Next MD visit: none scheduled Fall Risk: standard         Precautions: n/a T, Y x 10, 2 sets 3#   Standing FR Alternating I T & Y 1#: 2 x 12 TRX shoulder flexion and pec stretch X 1 min   TRX Shoulder Flexion 1 x 10 TRX SA rows X 10   Rows w green tubing 2 x 12   -  (B) shd ER X 10, 2 green theraband   STM to upper back: trapeziu

## 2018-01-10 ENCOUNTER — APPOINTMENT (OUTPATIENT)
Dept: PHYSICAL THERAPY | Age: 53
End: 2018-01-10
Payer: OTHER GOVERNMENT

## 2018-03-30 ENCOUNTER — HOSPITAL ENCOUNTER (EMERGENCY)
Age: 53
Discharge: HOME | End: 2018-03-30
Attending: EMERGENCY MEDICINE
Payer: OTHER GOVERNMENT

## 2018-03-30 VITALS
HEART RATE: 100 BPM | TEMPERATURE: 99 F | BODY MASS INDEX: 21.79 KG/M2 | DIASTOLIC BLOOD PRESSURE: 80 MMHG | HEIGHT: 63 IN | RESPIRATION RATE: 18 BRPM | SYSTOLIC BLOOD PRESSURE: 112 MMHG | WEIGHT: 123 LBS | OXYGEN SATURATION: 99 %

## 2018-03-30 DIAGNOSIS — J11.1 INFLUENZA: ICD-10-CM

## 2018-03-30 DIAGNOSIS — B34.9 VIRAL SYNDROME: Primary | ICD-10-CM

## 2018-03-30 PROCEDURE — 99283 EMERGENCY DEPT VISIT LOW MDM: CPT

## 2018-03-30 RX ORDER — OSELTAMIVIR PHOSPHATE 75 MG/1
75 CAPSULE ORAL 2 TIMES DAILY
Qty: 10 CAPSULE | Refills: 0 | Status: SHIPPED | OUTPATIENT
Start: 2018-03-30 | End: 2018-04-04

## 2018-03-30 NOTE — ED PROVIDER NOTES
Patient Seen in: THE [facility] Emergency Department In [place]    History   Patient presents with:  Cough/URI    Stated Complaint:     HPI    60-year-old female presents emergency room for evaluation of cough.   Patient states on Wednesday which is 2 days ago kg/m²         Physical Exam    GENERAL: Patient is awake, alert, well-appearing, in no acute distress. HEENT:  no scleral icterus. Mucous membranes are moist, clear mucous rhinorrhea posterior pharynx, oropharynx is clear, uvula midline.   Tympanic Harpal

## 2018-03-30 NOTE — ED INITIAL ASSESSMENT (HPI)
Pt to the ED for evaluation of cough, fever and ear pressure that started Wednesday. Pt reports pain in back and chest with coughing.

## 2019-06-20 NOTE — PROGRESS NOTES
Certification From: 06/6/5037  To:3/5/2018     Referring Physician: Dr. Marvin Almanza  Dx:Cervical disc disorder at C6-C7 level with radiculopathy (m50.123) Cervical fusion syndrome (Q76.1)    Authorized # of Visits:  12         Next MD visit: none scheduled History and physical documented and up to date, allergies reviewed, lab results reviewed, pre-procedure education provided, patient verbalized understanding of procedure, procedural consent signed and patient is NPO. MET to Pec Major in supine  3 x 30 sec         Patient education: optimal posture          Standing FR Alternating I T & Y 1#: 2 x 12         TRX Shoulder Flexion 1 x 10         Rows w green tubing 2 x 12           STM to upper back: trapezius, rhom

## 2020-03-12 ENCOUNTER — HOSPITAL ENCOUNTER (OUTPATIENT)
Dept: MRI IMAGING | Facility: HOSPITAL | Age: 55
Discharge: HOME | End: 2020-03-12
Attending: FAMILY MEDICINE
Payer: OTHER GOVERNMENT

## 2020-03-12 DIAGNOSIS — Z12.39 SCREENING BREAST EXAMINATION: ICD-10-CM

## 2020-03-12 PROCEDURE — A9575 INJ GADOTERATE MEGLUMI 0.1ML: HCPCS | Performed by: RADIOLOGY

## 2020-03-12 PROCEDURE — 77049 MRI BREAST C-+ W/CAD BI: CPT | Performed by: FAMILY MEDICINE

## 2020-03-17 ENCOUNTER — ORDER TRANSCRIPTION (OUTPATIENT)
Dept: PHYSICAL THERAPY | Age: 55
End: 2020-03-17

## 2020-03-17 DIAGNOSIS — M25.561 BILATERAL KNEE PAIN: Primary | ICD-10-CM

## 2020-03-17 DIAGNOSIS — M25.562 BILATERAL KNEE PAIN: Primary | ICD-10-CM

## 2020-03-17 DIAGNOSIS — M79.601 RIGHT ARM PAIN: ICD-10-CM

## (undated) DEVICE — 3.0MM PRECISION NEURO (MATCH HEAD)

## (undated) DEVICE — SOL  .9 1000ML BTL

## (undated) DEVICE — MEDI-VAC NON-CONDUCTIVE SUCTION TUBING: Brand: CARDINAL HEALTH

## (undated) DEVICE — SUTURE MONOCRYL 3-0 Y936H

## (undated) DEVICE — SUTURE VICRYL 3-0 J442H

## (undated) DEVICE — CERVICAL CDS: Brand: MEDLINE INDUSTRIES, INC.

## (undated) DEVICE — FLOSEAL HEMOSTATIC MATRIX, 5ML: Brand: FLOSEAL HEMOSTATIC MATRIX

## (undated) DEVICE — SOLUTION SURG DURA PREP HAZMAT

## (undated) DEVICE — 3M™ STERI-DRAPE™ PATIENT ISOLATION DRAPE 1014: Brand: STERI-DRAPE™

## (undated) DEVICE — NVM5 NEEDLE MODULE M/E

## (undated) DEVICE — NVM5 NEEDLE MODULE S

## (undated) DEVICE — GOWN SURG AERO BLUE PERF LG

## (undated) DEVICE — DRAPE SRG 26X15IN UTL TPE STRL

## (undated) DEVICE — 3M™ TEGADERM™ TRANSPARENT FILM DRESSING, 1626W, 4 IN X 4-3/4 IN (10 CM X 12 CM), 50 EACH/CARTON, 4 CARTON/CASE: Brand: 3M™ TEGADERM™

## (undated) DEVICE — GAUZE SPONGES,12 PLY: Brand: CURITY

## (undated) DEVICE — BANDAGE ROLL,100% COTTON, 6 PLY, LARGE: Brand: KERLIX

## (undated) DEVICE — DISTRACTION SCREW 12MM

## (undated) DEVICE — 20 ML SYRINGE LUER-LOCK TIP: Brand: MONOJECT

## (undated) DEVICE — DRAPE SHEET LG

## (undated) DEVICE — NEEDLE HPO 18GA 1.5IN ECLPS

## (undated) DEVICE — MAXCESS C MODULE

## (undated) NOTE — LETTER
Date & Time: 3/30/2018, 12:44 PM  Patient: Gamaliel Lucas  Attending Provider:    Sincerely,    Tram Souza, DO         To Whom It May Concern:    Gamaliel Lucas was seen and treated in our department on 3/30/2018.  She should not return to work until 04/02

## (undated) NOTE — ED AVS SNAPSHOT
Ted Goldsmith   MRN: [id number]    Department:  [facility] Emergency Department in [place]   Date of Visit:  3/30/2018           Disclosure     Insurance plans vary and the physician(s) referred by the ER may not be covered by your plan.  Please contact tell this physician (or your personal doctor if your instructions are to return to your personal doctor) about any new or lasting problems. The primary care or specialist physician will see patients referred from the [facility] Emergency Department.  Salvadore Skiff